# Patient Record
Sex: FEMALE | Race: OTHER
[De-identification: names, ages, dates, MRNs, and addresses within clinical notes are randomized per-mention and may not be internally consistent; named-entity substitution may affect disease eponyms.]

---

## 2019-07-01 ENCOUNTER — HOSPITAL ENCOUNTER (EMERGENCY)
Dept: HOSPITAL 61 - ER | Age: 34
Discharge: HOME | End: 2019-07-01
Payer: COMMERCIAL

## 2019-07-01 VITALS — WEIGHT: 185.56 LBS | HEIGHT: 65 IN | BODY MASS INDEX: 30.92 KG/M2

## 2019-07-01 VITALS — DIASTOLIC BLOOD PRESSURE: 82 MMHG | SYSTOLIC BLOOD PRESSURE: 138 MMHG

## 2019-07-01 DIAGNOSIS — Z00.00: ICD-10-CM

## 2019-07-01 DIAGNOSIS — H92.03: Primary | ICD-10-CM

## 2019-07-01 PROCEDURE — 99283 EMERGENCY DEPT VISIT LOW MDM: CPT

## 2019-07-01 NOTE — PHYS DOC
Past Medical History


Past Medical History:  No Pertinent History


Past Surgical History:  No Surgical History


Alcohol Use:  None


Drug Use:  None





Adult General


Chief Complaint


Chief Complaint:  EARACHE/EAR PAIN





\[facility]\""


HPI





Patient is a 33  year old female who presents with bilateral ear pain that 

started on Saturday she went to urgent care on Sunday and was diagnosed with the

ear infection. She is put on amoxicillin. Patient only had 3 doses amoxicillin 

and she states she is not feeling better. Patient states she has not been taking

anything for pain.





Review of Systems


Review of Systems





Constitutional: Denies fever or chills []


Eyes: Denies change in visual acuity, redness, or eye pain []


HENT: Bilateral ear pain.  Denies nasal congestion or sore throat []


Respiratory: Denies cough or shortness of breath []


Cardiovascular: No additional information not addressed in HPI []


GI: Denies abdominal pain, nausea, vomiting, bloody stools or diarrhea []


: Denies dysuria or hematuria []


Musculoskeletal: Denies back pain or joint pain []


Integument: Denies rash or skin lesions []


Neurologic: Denies headache, focal weakness or sensory changes []


Endocrine: Denies polyuria or polydipsia []





All other systems were reviewed and found to be within normal limits, except as 

documented in this note.





Allergies


Allergies





Allergies








Coded Allergies Type Severity Reaction Last Updated Verified


 


  No Known Drug Allergies    7/1/19 No











Physical Exam


Physical Exam





Constitutional: Well developed, well nourished, no acute distress, non-toxic 

appearance. []


HENT: Normocephalic, atraumatic, bilateral external ears normal, oropharynx 

moist, no oral exudates, nose normal. Bilateral ear tympanic redness.  []


Eyes: PERRLA, EOMI, conjunctiva normal, no discharge. [] 


Neck: Normal range of motion, no tenderness, supple, no stridor. [] 


Cardiovascular:Heart rate regular rhythm, no murmur []


Lungs & Thorax:  Bilateral breath sounds clear to auscultation []


Abdomen: Bowel sounds normal, soft, no tenderness, no masses, no pulsatile 

masses. [] 


Skin: Warm, dry, no erythema, no rash. [] 


Back: No tenderness, no CVA tenderness. [] 


Extremities: No tenderness, no cyanosis, no clubbing, ROM intact, no edema. [] 


Neurologic: Alert and oriented X 3, normal motor function, normal sensory 

function, no focal deficits noted. []


Psychologic: Affect normal, judgement normal, mood normal. []





Current Patient Data


Vital Signs





                                   Vital Signs








  Date Time  Temp Pulse Resp B/P (MAP) Pulse Ox O2 Delivery O2 Flow Rate FiO2


 


7/1/19 18:31 98.3 73 16 138/82 (100) 99 Room Air  





 98.3       











EKG


EKG


[]





Radiology/Procedures


Radiology/Procedures


[]





Course & Med Decision Making


Course & Med Decision Making


Patient is a 33  year old female who presents with bilateral ear pain that 

started on Saturday she went to urgent care on Sunday and was diagnosed with the

 ear infection. She is put on amoxicillin. Patient only had 3 doses amoxicillin 

and she states she is not feeling better. Patient states she has not been taking

 anything for pain. Alert and oriented. Speaks in full clear sentences. Lungs 

are clear to auscultation all lobes. Abdomen is soft and nontender. Patient 

denies nausea, vomiting, diarrhea, shortness of air, chest pain. Bilateral ear 

tympanics are reddened. No lymph nodes are palpable. Patient will be given a 

protrusion for hydrocodone told to take ibuprofen with it. Patient is told to 

continue taking the amoxicillin and the caretaker 3-4 days before she actually 

starts to feel better. Vital Signs are within normal limits. Afebrile.





Dragon Disclaimer


Dragon Disclaimer


This electronic medical record was generated, in whole or in part, using a voice

 recognition dictation system.





Departure


Departure


Impression:  


   Primary Impression:  


   Encounter for medical screening examination


   Additional Impression:  


   Ear pain


Disposition:  01 HOME, SELF-CARE


Condition:  STABLE


Referrals:  


UNKNOWN PCP NAME (PCP)


Patient Instructions:  Medical Screening Exam





Additional Instructions:  


Continue taking the amoxicillin as prescribed. Take the hydrocodone with 

ibuprofen to help with pain. If not better within 5 days follow primary care 

provider.


Scripts


Hydrocodone Bit/Acetaminophen (HYDROCODONE-APAP 5-325  **) 1 Tab Tablet


1 TAB PO PRN Q6HRS PRN for PAIN, #8 TAB 0 Refills


   Prov: AMOL HARDY         7/1/19





Problem Qualifiers








   Additional Impression:  


   Ear pain


   Laterality:  bilateral  Qualified Codes:  H92.03 - Otalgia, bilateral








AMOL HARDY APRCORNELIO             Jul 1, 2019 19:00